# Patient Record
Sex: FEMALE | Race: OTHER | Employment: FULL TIME | URBAN - METROPOLITAN AREA
[De-identification: names, ages, dates, MRNs, and addresses within clinical notes are randomized per-mention and may not be internally consistent; named-entity substitution may affect disease eponyms.]

---

## 2019-03-27 ENCOUNTER — HOSPITAL ENCOUNTER (OUTPATIENT)
Age: 29
Discharge: HOME | End: 2019-03-27
Attending: EMERGENCY MEDICINE
Payer: COMMERCIAL

## 2019-03-27 VITALS
WEIGHT: 162 LBS | SYSTOLIC BLOOD PRESSURE: 104 MMHG | RESPIRATION RATE: 18 BRPM | OXYGEN SATURATION: 100 % | TEMPERATURE: 99 F | HEART RATE: 71 BPM | DIASTOLIC BLOOD PRESSURE: 67 MMHG

## 2019-03-27 DIAGNOSIS — H10.12 ALLERGIC CONJUNCTIVITIS OF LEFT EYE: Primary | ICD-10-CM

## 2019-03-27 PROCEDURE — 99202 OFFICE O/P NEW SF 15 MIN: CPT

## 2019-03-27 PROCEDURE — 99203 OFFICE O/P NEW LOW 30 MIN: CPT

## 2019-03-28 NOTE — ED PROVIDER NOTES
Patient Seen in: [facility] In [address] [place] [address]    History   Patient presents with:   Eye Visual Problem (opthalmic)    Stated Complaint: lt eye reness    HPI    Patient is a 25-year-old female who presents to the urgent care with a chief co diagnosis)    Disposition:  There is no disposition on file for this visit.   3/27/2019  8:43 pm    Follow-up:  Elijah Nix MD  [address] [address]  [phone]      As needed        Medications Prescribed:  Current Discharge [facility]

## 2019-03-28 NOTE — ED INITIAL ASSESSMENT (HPI)
Had eye lash extension Sunday and noticed she now has pink eye and increased exudate and light sensitivity used her cousins eye drops pink eye relief and polymyxin drops

## (undated) NOTE — LETTER
Date & Time: 3/27/2019, 8:44 PM  Patient: Mina Denise  Encounter Provider(s):    Sender, Mel Hayes MD       To Whom It May Concern:    Mina Denise was seen and treated in our department on 3/27/2019. She can return to work.     If you have any question